# Patient Record
Sex: MALE | Race: BLACK OR AFRICAN AMERICAN | Employment: FULL TIME | URBAN - METROPOLITAN AREA
[De-identification: names, ages, dates, MRNs, and addresses within clinical notes are randomized per-mention and may not be internally consistent; named-entity substitution may affect disease eponyms.]

---

## 2017-05-23 ENCOUNTER — HOSPITAL ENCOUNTER (EMERGENCY)
Facility: CLINIC | Age: 37
Discharge: HOME | End: 2017-05-24
Attending: EMERGENCY MEDICINE | Admitting: EMERGENCY MEDICINE
Payer: COMMERCIAL

## 2017-05-23 ENCOUNTER — APPOINTMENT (OUTPATIENT)
Dept: GENERAL RADIOLOGY | Facility: CLINIC | Age: 37
End: 2017-05-23
Attending: EMERGENCY MEDICINE
Payer: COMMERCIAL

## 2017-05-23 DIAGNOSIS — R07.9 CHEST PAIN, UNSPECIFIED TYPE: ICD-10-CM

## 2017-05-23 LAB
ANION GAP SERPL CALCULATED.3IONS-SCNC: 9 MMOL/L (ref 3–14)
BASOPHILS # BLD AUTO: 0 10E9/L (ref 0–0.2)
BASOPHILS NFR BLD AUTO: 0.9 %
BUN SERPL-MCNC: 12 MG/DL (ref 7–30)
CALCIUM SERPL-MCNC: 8.8 MG/DL (ref 8.5–10.1)
CHLORIDE SERPL-SCNC: 107 MMOL/L (ref 94–109)
CO2 SERPL-SCNC: 27 MMOL/L (ref 20–32)
CREAT SERPL-MCNC: 0.92 MG/DL (ref 0.66–1.25)
DIFFERENTIAL METHOD BLD: ABNORMAL
EOSINOPHIL # BLD AUTO: 0.5 10E9/L (ref 0–0.7)
EOSINOPHIL NFR BLD AUTO: 10.6 %
ERYTHROCYTE [DISTWIDTH] IN BLOOD BY AUTOMATED COUNT: 13.4 % (ref 10–15)
GFR SERPL CREATININE-BSD FRML MDRD: ABNORMAL ML/MIN/1.7M2
GLUCOSE SERPL-MCNC: 119 MG/DL (ref 70–99)
HCT VFR BLD AUTO: 40.4 % (ref 40–53)
HGB BLD-MCNC: 13.9 G/DL (ref 13.3–17.7)
IMM GRANULOCYTES # BLD: 0 10E9/L (ref 0–0.4)
IMM GRANULOCYTES NFR BLD: 0.4 %
LYMPHOCYTES # BLD AUTO: 1.6 10E9/L (ref 0.8–5.3)
LYMPHOCYTES NFR BLD AUTO: 34.9 %
MCH RBC QN AUTO: 28.7 PG (ref 26.5–33)
MCHC RBC AUTO-ENTMCNC: 34.4 G/DL (ref 31.5–36.5)
MCV RBC AUTO: 83 FL (ref 78–100)
MONOCYTES # BLD AUTO: 0.4 10E9/L (ref 0–1.3)
MONOCYTES NFR BLD AUTO: 8.5 %
NEUTROPHILS # BLD AUTO: 2.1 10E9/L (ref 1.6–8.3)
NEUTROPHILS NFR BLD AUTO: 44.7 %
NRBC # BLD AUTO: 0 10*3/UL
NRBC BLD AUTO-RTO: 0 /100
PLATELET # BLD AUTO: 105 10E9/L (ref 150–450)
POTASSIUM SERPL-SCNC: 3.5 MMOL/L (ref 3.4–5.3)
RBC # BLD AUTO: 4.85 10E12/L (ref 4.4–5.9)
SODIUM SERPL-SCNC: 143 MMOL/L (ref 133–144)
TROPONIN I SERPL-MCNC: NORMAL UG/L (ref 0–0.04)
WBC # BLD AUTO: 4.6 10E9/L (ref 4–11)

## 2017-05-23 PROCEDURE — 71020 XR CHEST 2 VW: CPT

## 2017-05-23 PROCEDURE — 85025 COMPLETE CBC W/AUTO DIFF WBC: CPT | Performed by: EMERGENCY MEDICINE

## 2017-05-23 PROCEDURE — 99285 EMERGENCY DEPT VISIT HI MDM: CPT | Mod: 25

## 2017-05-23 PROCEDURE — 25000132 ZZH RX MED GY IP 250 OP 250 PS 637: Performed by: EMERGENCY MEDICINE

## 2017-05-23 PROCEDURE — 80048 BASIC METABOLIC PNL TOTAL CA: CPT | Performed by: EMERGENCY MEDICINE

## 2017-05-23 PROCEDURE — 93005 ELECTROCARDIOGRAM TRACING: CPT

## 2017-05-23 PROCEDURE — 96374 THER/PROPH/DIAG INJ IV PUSH: CPT

## 2017-05-23 PROCEDURE — 84484 ASSAY OF TROPONIN QUANT: CPT | Performed by: EMERGENCY MEDICINE

## 2017-05-23 RX ORDER — NITROGLYCERIN 0.4 MG/1
0.4 TABLET SUBLINGUAL EVERY 5 MIN PRN
Status: DISCONTINUED | OUTPATIENT
Start: 2017-05-23 | End: 2017-05-24 | Stop reason: HOSPADM

## 2017-05-23 RX ORDER — ASPIRIN 325 MG
325 TABLET ORAL ONCE
Status: COMPLETED | OUTPATIENT
Start: 2017-05-23 | End: 2017-05-23

## 2017-05-23 RX ADMIN — NITROGLYCERIN 0.4 MG: 0.4 TABLET SUBLINGUAL at 23:29

## 2017-05-23 RX ADMIN — ASPIRIN 325 MG ORAL TABLET 325 MG: 325 PILL ORAL at 23:27

## 2017-05-23 ASSESSMENT — ENCOUNTER SYMPTOMS
SHORTNESS OF BREATH: 0
FEVER: 0
ABDOMINAL PAIN: 0
VOMITING: 0

## 2017-05-23 NOTE — ED AVS SNAPSHOT
Emergency Department    [address] [facility] [address]    Phone:  [phone]    Fax:  [phone]                                       Rachel Blackman   MRN: [id number]    Department:   Emergency Department   Date of Visit:  5/23/2017           Patient Information     Date Of Birth          1980        Your diagnoses for this visit were:     Chest pain, unspecified type        You were seen by Archie Vargas DO.      Follow-up Information     Follow up with Ajay Nelson MD In 2 days.    Contact information:    [facility]  [address]  [place] [address]  [phone]          Follow up with  Emergency Department.    Specialty:  EMERGENCY MEDICINE    Why:  If symptoms worsen    Contact information:    [address] [facility] [address] [phone]        Discharge Instructions          *CHEST PAIN, UNCERTAIN CAUSE    Based on your exam today, the exact cause of your chest pain is not certain. Your condition does not seem serious at this time, and your pain does not appear to be coming from your heart. However, sometimes the signs of a serious problem take more time to appear. Therefore, watch for the warning signs listed below.  HOME CARE:  1. Rest today and avoid strenuous activity.  2. Take any prescribed medicine as directed.  FOLLOW UP with your doctor in 1-3 days.   GET PROMPT MEDICAL ATTENTION if any of the following occur:    A change in the type of pain: if it feels different, becomes more severe, lasts longer, or begins to spread into your shoulder, arm, neck, jaw or back    Shortness of breath or increased pain with breathing    Weakness, dizziness, or fainting    Cough with blood or dark colored sputum (phlegm)    Fever over 101  F (38.3  C)    Swelling, pain or redness in one leg    1255-0440 [facility], [address] [place] [address], [place] [address]. All rights reserved. This information is not intended as a substitute for  professional medical care. Always follow your healthcare professional's instructions.      24 Hour Appointment Hotline       To make an appointment at any [facility], call [phone] ([phone]). If you don't have a family doctor or clinic, we will help you find one. [facility] clinics are conveniently located to serve the needs of you and your family.          ED Discharge Orders     Exercise Stress Echocardiogram       The supervising Cardiologist may change the type of echocardiogram requested to answer a specific clinical question based on existing protocols in the Echocardiography laboratory.    Use of contrast is at the discretion of the supervising Cardiologist.                     Review of your medicines      Our records show that you are taking the medicines listed below. If these are incorrect, please call your family doctor or clinic.        Dose / Directions Last dose taken    DILTIAZEM HCL PO        Refills:  0        isoniazid 300 MG tablet   Commonly known as:  NYDRAZID   Dose:  300 mg   Quantity:  90 tablet        Take 1 tablet (300 mg) by mouth daily   Refills:  2        LOSARTAN POTASSIUM PO        Refills:  0        pyridOXINE 25 MG tablet   Commonly known as:  vitamin B-6   Dose:  25 mg   Quantity:  90 tablet        Take 1 tablet (25 mg) by mouth daily   Refills:  2                Procedures and tests performed during your visit     Procedure/Test Number of Times Performed    Basic metabolic panel 1    CBC with platelets differential 1    EKG 12 lead 1    Troponin I 2    XR Chest 2 Views 1      Orders Needing Specimen Collection     None      Pending Results     Date and Time Order Name Status Description    5/23/2017 2320 XR Chest 2 Views Preliminary     5/23/2017 2305 EKG 12 lead Preliminary             Pending Culture Results     No orders found for last 3 day(s).            Pending Results Instructions     If you had any lab results that were not finalized at the time of your  Discharge, you can call the ED Lab Result RN at [phone]. You will be contacted by this team for any positive Lab results or changes in treatment. The nurses are available 7 days a week from 10A to 6:30P.  You can leave a message 24 hours per day and they will return your call.        Test Results From Your Hospital Stay        5/23/2017 11:33 PM      Component Results     Component Value Ref Range & Units Status    WBC 4.6 4.0 - 11.0 10e9/L Final    RBC Count 4.85 4.4 - 5.9 10e12/L Final    Hemoglobin 13.9 13.3 - 17.7 g/dL Final    Hematocrit 40.4 40.0 - 53.0 % Final    MCV 83 78 - 100 fl Final    MCH 28.7 26.5 - 33.0 pg Final    MCHC 34.4 31.5 - 36.5 g/dL Final    RDW 13.4 10.0 - 15.0 % Final    Platelet Count 105 (L) 150 - 450 10e9/L Final    Diff Method Automated Method  Final    % Neutrophils 44.7 % Final    % Lymphocytes 34.9 % Final    % Monocytes 8.5 % Final    % Eosinophils 10.6 % Final    % Basophils 0.9 % Final    % Immature Granulocytes 0.4 % Final    Nucleated RBCs 0 0 /100 Final    Absolute Neutrophil 2.1 1.6 - 8.3 10e9/L Final    Absolute Lymphocytes 1.6 0.8 - 5.3 10e9/L Final    Absolute Monocytes 0.4 0.0 - 1.3 10e9/L Final    Absolute Eosinophils 0.5 0.0 - 0.7 10e9/L Final    Absolute Basophils 0.0 0.0 - 0.2 10e9/L Final    Abs Immature Granulocytes 0.0 0 - 0.4 10e9/L Final    Absolute Nucleated RBC 0.0  Final         5/23/2017 11:49 PM      Component Results     Component Value Ref Range & Units Status    Sodium 143 133 - 144 mmol/L Final    Potassium 3.5 3.4 - 5.3 mmol/L Final    Chloride 107 94 - 109 mmol/L Final    Carbon Dioxide 27 20 - 32 mmol/L Final    Anion Gap 9 3 - 14 mmol/L Final    Glucose 119 (H) 70 - 99 mg/dL Final    Urea Nitrogen 12 7 - 30 mg/dL Final    Creatinine 0.92 0.66 - 1.25 mg/dL Final    GFR Estimate >90  Non  GFR Calc   >60 mL/min/1.7m2 Final    GFR Estimate If Black >90   GFR Calc   >60 mL/min/1.7m2 Final    Calcium 8.8 8.5 - 10.1 mg/dL  Final         5/23/2017 11:51 PM      Component Results     Component Value Ref Range & Units Status    Troponin I ES  0.000 - 0.045 ug/L Final    <0.015  The 99th percentile for upper reference range is 0.045 ug/L.  Troponin values in   the range of 0.045 - 0.120 ug/L may be associated with risks of adverse   clinical events.           5/24/2017 12:20 AM      Narrative     CHEST 2 VIEWS  5/23/2017 11:36 PM     HISTORY: Right-sided chest pain.    COMPARISON: 4/6/2015.    FINDINGS: The lungs are clear. Normal-sized cardiac silhouette.        Impression     IMPRESSION: No evidence of active cardiopulmonary disease.         5/24/2017  1:31 AM      Component Results     Component Value Ref Range & Units Status    Troponin I ES  0.000 - 0.045 ug/L Final    <0.015  The 99th percentile for upper reference range is 0.045 ug/L.  Troponin values in   the range of 0.045 - 0.120 ug/L may be associated with risks of adverse   clinical events.                  Clinical Quality Measure: Blood Pressure Screening     Your blood pressure was checked while you were in the emergency department today. The last reading we obtained was  BP: (!) 153/98 . Please read the guidelines below about what these numbers mean and what you should do about them.  If your systolic blood pressure (the top number) is less than 120 and your diastolic blood pressure (the bottom number) is less than 80, then your blood pressure is normal. There is nothing more that you need to do about it.  If your systolic blood pressure (the top number) is 120-139 or your diastolic blood pressure (the bottom number) is 80-89, your blood pressure may be higher than it should be. You should have your blood pressure rechecked within a year by a primary care provider.  If your systolic blood pressure (the top number) is 140 or greater or your diastolic blood pressure (the bottom number) is 90 or greater, you may have high blood pressure. High blood pressure is treatable, but  "if left untreated over time it can put you at risk for heart attack, stroke, or kidney failure. You should have your blood pressure rechecked by a primary care provider within the next 4 weeks.  If your provider in the emergency department today gave you specific instructions to follow-up with your doctor or provider even sooner than that, you should follow that instruction and not wait for up to 4 weeks for your follow-up visit.        Thank you for choosing [facility]       Thank you for choosing [facility] for your care. Our goal is always to provide you with excellent care. Hearing back from our patients is one way we can continue to improve our services. Please take a few minutes to complete the written survey that you may receive in the mail after you visit with us. Thank you!        [facility] Information     [facility] lets you send messages to your doctor, view your test results, renew your prescriptions, schedule appointments and more. To sign up, go to [email or web address] . Click on \"Log in\" on the left side of the screen, which will take you to the Welcome page. Then click on \"Sign up Now\" on the right side of the page.     You will be asked to enter the access code listed below, as well as some personal information. Please follow the directions to create your username and password.     Your access code is: [email or web address]  Expires: 2017  1:36 AM     Your access code will  in 90 days. If you need help or a new code, please call your [facility] clinic or [phone].        Care EveryWhere ID     This is your Care EveryWhere ID. This could be used by other organizations to access your [facility] medical records  [id number]        After Visit Summary       This is your record. Keep this with you and show to your community pharmacist(s) and doctor(s) at your next visit.                  "

## 2017-05-23 NOTE — ED AVS SNAPSHOT
Emergency Department    [address] [place] [address]    Phone:  [phone]    Fax:  [phone]                                       Rachel Blackman   MRN: [id number]    Department:   Emergency Department   Date of Visit:  5/23/2017           After Visit Summary Signature Page     I have received my discharge instructions, and my questions have been answered. I have discussed any challenges I see with this plan with the nurse or doctor.    ..........................................................................................................................................  Patient/Patient Representative Signature      ..........................................................................................................................................  Patient Representative Print Name and Relationship to Patient    ..................................................               ................................................  Date                                            Time    ..........................................................................................................................................  Reviewed by Signature/Title    ...................................................              ..............................................  Date                                                            Time

## 2017-05-24 VITALS
HEART RATE: 77 BPM | DIASTOLIC BLOOD PRESSURE: 103 MMHG | BODY MASS INDEX: 24.78 KG/M2 | RESPIRATION RATE: 14 BRPM | OXYGEN SATURATION: 97 % | TEMPERATURE: 98.5 F | WEIGHT: 177 LBS | HEIGHT: 71 IN | SYSTOLIC BLOOD PRESSURE: 155 MMHG

## 2017-05-24 LAB
INTERPRETATION ECG - MUSE: NORMAL
TROPONIN I SERPL-MCNC: NORMAL UG/L (ref 0–0.04)

## 2017-05-24 PROCEDURE — 84484 ASSAY OF TROPONIN QUANT: CPT | Performed by: EMERGENCY MEDICINE

## 2017-05-24 PROCEDURE — 25000128 H RX IP 250 OP 636: Performed by: EMERGENCY MEDICINE

## 2017-05-24 RX ORDER — KETOROLAC TROMETHAMINE 30 MG/ML
30 INJECTION, SOLUTION INTRAMUSCULAR; INTRAVENOUS ONCE
Status: COMPLETED | OUTPATIENT
Start: 2017-05-24 | End: 2017-05-24

## 2017-05-24 RX ADMIN — KETOROLAC TROMETHAMINE 30 MG: 30 INJECTION, SOLUTION INTRAMUSCULAR at 00:20

## 2017-05-24 NOTE — ED PROVIDER NOTES
"  History     Chief Complaint:  Chest Pain    HPI   Rachel Blackman is a 36 year old male, with a history of hypertension, who presents in the emergency department for evaluation of chest pain. The patient noted the pain started around 2130 as the patient was just walking around, then experienced a sudden onset of right chest pain and heaviness in the right shoulder. He noted the only movement to exacerbate the pain was turning his head to the right. He denies experiencing any vomiting, fever, shortness of breath, diaphoresis, or abdominal pain.    Allergies:  No Known Drug Allergies      Medications:    Diltiazem HCl  Losartan potassium  Nydrazid    Past Medical History:    Hypertension    Past Surgical History:    Hemorrhoidectomy    Family History:    Hypertension  Diabetes    Social History:  The patient was accompanied to the ED alone.  Smoking Status: No  Smokeless Tobacco: No  Alcohol Use: No   Marital Status:  Single [1]     Review of Systems   Constitutional: Negative for fever.   Respiratory: Negative for shortness of breath.    Cardiovascular: Positive for chest pain.   Gastrointestinal: Negative for abdominal pain and vomiting.     Physical Exam   Vitals:  Patient Vitals for the past 24 hrs:   BP Temp Temp src Pulse Heart Rate Resp SpO2 Height Weight   05/24/17 0100 (!) 153/98 - - - 72 13 98 % - -   05/24/17 0045 (!) 157/101 - - - 68 15 96 % - -   05/24/17 0030 (!) 157/99 - - - 73 16 95 % - -   05/24/17 0015 (!) 152/102 - - - 75 15 96 % - -   05/24/17 0000 (!) 158/100 - - - 69 13 97 % - -   05/23/17 2345 (!) 157/97 - - - 77 14 98 % - -   05/23/17 2343 - - - - 70 16 95 % - -   05/23/17 2342 - - - - 71 16 95 % - -   05/23/17 2341 (!) 152/98 - - - 77 11 - - -   05/23/17 2330 (!) 157/101 - - - 77 11 96 % - -   05/23/17 2309 (!) 149/106 98.5  F (36.9  C) Oral 77 - 18 99 % 1.803 m (5' 11\") 80.3 kg (177 lb)      Physical Exam   Physical Exam   General:  Sitting on bed with alone.   HENT:  No obvious trauma to " head  Right Ear:  External ear normal.   Left Ear:  External ear normal.   Nose:  Nose normal.   Eyes:  Conjunctivae and EOM are normal. Pupils are equal, round, and reactive.   Neck: Normal range of motion. Neck supple. No tracheal deviation present.   CV:  Normal heart sounds. No murmur heard.  Pulm/Chest: Effort normal and breath sounds normal.   Abd: Soft. No distension. There is no tenderness. There is no rigidity, no rebound and no guarding.   M/S: Normal range of motion. right sided chest pain is reproducible when the pt turns his head and engages the muscles of the right chest wall.  Neuro: Alert. GCS 15.  Skin: Skin is warm and dry. No rash noted. Not diaphoretic.   Psych: Normal mood and affect. Behavior is normal.     Emergency Department Course     ECG:  ECG taken at 2313, ECG read at 2315  Normal sinus rhythm  Septal infarct, age undetermined  Abnormal ECG  Rate 69 bpm. TN interval 154. QRS duration 86. QT/QTc 378/405. P-R-T axes 61 29 31.     Imaging:  Radiology findings were communicated with the patient who voiced understanding of the findings.  XR Chest:  IMPRESSION: No evidence of active cardiopulmonary disease.  Reading per radiology.     Laboratory:  Laboratory findings were communicated with the patient who voiced understanding of the findings.  CBC:  o/w WNL (WBC 4.6, HGB 13.9)   BMP: Glucose 119 (H) o/w WNL (Creatinine 0.92)   Troponin (Collected 2325): <0.015   Troponin (Collected 0025): <0.015      Interventions:  2327 Aspirin 325 mg PO  2329 Nitro 0.4 mg SL  0020 Toradol 30 mg IV      Emergency Department Course:  Nursing notes and vitals reviewed.  I performed an exam of the patient as documented above.   The patient was sent for a XR chest while in the emergency department, results above.   IV was inserted and blood was drawn for laboratory testing, results above.   EKG obtained in the ED, see results above.      0115 Re-examined pt. He feels much better now after the Toradol and  is near pain free.    I discussed the treatment plan with the patient. They expressed understanding of this plan and consented to discharge. They will be discharged home with instructions for care and follow up. In addition, the patient will return to the emergency department if their symptoms persist, worsen, if new symptoms arise or if there is any concern.  All questions were answered.     I personally reviewed the laboratory results with the Patient and answered all related questions prior to discharge.    Impression & Plan      Medical Decision Making:  Rachel Blackman is a very pleasant 36 year old year old male who presents to the emergency department with concern of chest pain of unclear etiology.  At this time I do not suspect that there is an acute/dangerous pathology for the chest pain.  They have no significant personal/family history of cardiac disease. They have no significant cardiac risk factors.  The EKG was reviewed and shows no evidence of Brugada syndrome, Major-Parkinson-White, hypertrophic cardiomyopathy or prolonged QTc syndrome. The chest xray was reviewed and shows no evidence of pneumothorax, infiltrate to suggest pneumonia, widened mediastinum to suggest aortic dissection, obvious rib fracture or free air under the diaphragm to suggest perforated viscous ulcer. Their troponin was negative and a repeat 2 hour troponin was also negative. The patient does not smoke and is otherwise PERC negative. There are no focal infiltrates, effusions, pulmonary edema, or evidence of PTX on CXR. The patient has not had recent fevers or viral infections to suggest pericarditis.  They are at low risk for aortic pathology and have normal aortic contour on CXR.  They have not had recent chest trauma.  All of this was discussed with the patient and they were reassurred. I have ordered an outpatient stress test for the patient to schedule sometime early next week. I have advised them to follow-up with their PCP  in the next 3 days to get further evaluation, and to return to the ED sooner if their chest pain continues/worsens, they develop severe SOB/fevers/lightheadedness, or they develop any other new and concerning symptoms. At this point the patient is stable and appropriate for discharge. His pain is only present on the right side and is reproducible with movement of those muscles and I suspect that this is musculoskeletal.    The treatment plan was discussed with the patient and they expressed understanding of this plan and consented to the plan.  In addition, the patient will return to the emergency department if their symptoms persist, worsen, if new symptoms arise or if there is any concern as other pathology may be present that is not evident at this time. They also understand the importance of close follow up in the clinic and if unable to do so will return to the emergency department for a reevaluation. All questions were answered.    Diagnosis:    ICD-10-CM    1. Chest pain, unspecified type R07.9 Exercise Stress Echocardiogram     Troponin I     Disposition:   Discharged    Discharge Medications:  New Prescriptions    No medications on file     Scribe Disclosure:  Chastity MOULTON, am serving as a scribe at 11:17 PM on 5/23/2017 to document services personally performed by Archie Vargas DO, based on my observations and the provider's statements to me.   5/23/2017    EMERGENCY DEPARTMENT       Archie Vargas DO  05/24/17 0137

## 2017-05-24 NOTE — DISCHARGE INSTRUCTIONS
*CHEST PAIN, UNCERTAIN CAUSE    Based on your exam today, the exact cause of your chest pain is not certain. Your condition does not seem serious at this time, and your pain does not appear to be coming from your heart. However, sometimes the signs of a serious problem take more time to appear. Therefore, watch for the warning signs listed below.  HOME CARE:  1. Rest today and avoid strenuous activity.  2. Take any prescribed medicine as directed.  FOLLOW UP with your doctor in 1-3 days.   GET PROMPT MEDICAL ATTENTION if any of the following occur:    A change in the type of pain: if it feels different, becomes more severe, lasts longer, or begins to spread into your shoulder, arm, neck, jaw or back    Shortness of breath or increased pain with breathing    Weakness, dizziness, or fainting    Cough with blood or dark colored sputum (phlegm)    Fever over 101  F (38.3  C)    Swelling, pain or redness in one leg    [id number] [address], [place] [address]. All rights reserved. This information is not intended as a substitute for professional medical care. Always follow your healthcare professional's instructions.

## 2017-06-07 ENCOUNTER — HOSPITAL ENCOUNTER (OUTPATIENT)
Dept: CARDIOLOGY | Facility: CLINIC | Age: 37
Discharge: HOME | End: 2017-06-07
Attending: EMERGENCY MEDICINE | Admitting: EMERGENCY MEDICINE
Payer: MEDICAID

## 2017-06-07 DIAGNOSIS — R07.9 CHEST PAIN, UNSPECIFIED TYPE: ICD-10-CM

## 2017-06-07 PROCEDURE — 25500064 ZZH RX 255 OP 636: Performed by: EMERGENCY MEDICINE

## 2017-06-07 PROCEDURE — 93325 DOPPLER ECHO COLOR FLOW MAPG: CPT | Mod: 26 | Performed by: INTERNAL MEDICINE

## 2017-06-07 PROCEDURE — 40000264 ECHO STRESS TEST WITH LUMASON

## 2017-06-07 PROCEDURE — 93016 CV STRESS TEST SUPVJ ONLY: CPT | Performed by: INTERNAL MEDICINE

## 2017-06-07 PROCEDURE — 93018 CV STRESS TEST I&R ONLY: CPT | Performed by: INTERNAL MEDICINE

## 2017-06-07 PROCEDURE — 93350 STRESS TTE ONLY: CPT | Mod: 26 | Performed by: INTERNAL MEDICINE

## 2017-06-07 PROCEDURE — 93321 DOPPLER ECHO F-UP/LMTD STD: CPT | Mod: 26 | Performed by: INTERNAL MEDICINE

## 2017-06-07 RX ADMIN — SULFUR HEXAFLUORIDE 5 ML: KIT at 16:10

## 2017-06-23 ENCOUNTER — HOSPITAL ENCOUNTER (EMERGENCY)
Facility: CLINIC | Age: 37
Discharge: HOME | End: 2017-06-23
Attending: EMERGENCY MEDICINE | Admitting: EMERGENCY MEDICINE
Payer: OTHER MISCELLANEOUS

## 2017-06-23 VITALS
BODY MASS INDEX: 24.5 KG/M2 | OXYGEN SATURATION: 99 % | DIASTOLIC BLOOD PRESSURE: 86 MMHG | SYSTOLIC BLOOD PRESSURE: 128 MMHG | HEIGHT: 71 IN | TEMPERATURE: 97.7 F | HEART RATE: 61 BPM | RESPIRATION RATE: 16 BRPM | WEIGHT: 175 LBS

## 2017-06-23 DIAGNOSIS — S09.90XA CLOSED HEAD INJURY, INITIAL ENCOUNTER: ICD-10-CM

## 2017-06-23 PROCEDURE — 99282 EMERGENCY DEPT VISIT SF MDM: CPT

## 2017-06-23 ASSESSMENT — ENCOUNTER SYMPTOMS
VOMITING: 0
FEVER: 0
WOUND: 0
HEADACHES: 1
DIZZINESS: 1
NAUSEA: 0

## 2017-06-23 NOTE — DISCHARGE INSTRUCTIONS
Take ibuprofen or Tylenol as needed for pain.    Drink plenty of fluids and get plenty of rest.    Avoid all contact sports or activities that could put you at risk for another head injury for at least 1 week from when you are feeling back to normal.

## 2017-06-23 NOTE — LETTER
EMERGENCY DEPARTMENT  [address] [facility] [address]  [phone]    2017    Rachel Blackman  [address]   [facility] [address]  none (home) none (work)    : 1980      To Whom it may concern:    Rachel Blackman was seen in our Emergency Department today, 2017. Please excuse him from work today and tomorrow.  He can return to normal duty on 17.     Sincerely,        Sally Casillas

## 2017-06-23 NOTE — ED AVS SNAPSHOT
Emergency Department    [address] [facility] [address]    Phone:  [phone]    Fax:  [phone]                                       Rachel Blackman   MRN: [id number]    Department:   Emergency Department   Date of Visit:  6/23/2017           Patient Information     Date Of Birth          1980        Your diagnoses for this visit were:     Closed head injury, initial encounter        You were seen by Sally Casillas MD.      Follow-up Information     Follow up with  Emergency Department.    Specialty:  EMERGENCY MEDICINE    Why:  As needed, If symptoms worsen or for vomiting or confusion    Contact information:    [address] [facility] [address] [phone]        Follow up with Ajay Nelson MD.    Why:  next week for recheck    Contact information:    [facility]  [address]  [place] [address]  [phone]          Discharge Instructions       Take ibuprofen or Tylenol as needed for pain.    Drink plenty of fluids and get plenty of rest.    Avoid all contact sports or activities that could put you at risk for another head injury for at least 1 week from when you are feeling back to normal.    Discharge References/Attachments     HEAD INJURY WITH SLEEP MONITORING (ADULT) (ENGLISH)      24 Hour Appointment Hotline       To make an appointment at any [facility], call [phone] ([phone]). If you don't have a family doctor or clinic, we will help you find one. [facility] are conveniently located to serve the needs of you and your family.             Review of your medicines      Our records show that you are taking the medicines listed below. If these are incorrect, please call your family doctor or clinic.        Dose / Directions Last dose taken    DILTIAZEM HCL PO        Refills:  0        isoniazid 300 MG tablet   Commonly known as:  NYDRAZID   Dose:  300 mg   Quantity:  90 tablet        Take 1 tablet (300 mg) by  mouth daily   Refills:  2        LOSARTAN POTASSIUM PO        Refills:  0        pyridOXINE 25 MG tablet   Commonly known as:  vitamin B-6   Dose:  25 mg   Quantity:  90 tablet        Take 1 tablet (25 mg) by mouth daily   Refills:  2                Orders Needing Specimen Collection     None      Pending Results     No orders found from 6/21/2017 to 6/24/2017.            Pending Culture Results     No orders found from 6/21/2017 to 6/24/2017.            Pending Results Instructions     If you had any lab results that were not finalized at the time of your Discharge, you can call the ED Lab Result RN at [phone]. You will be contacted by this team for any positive Lab results or changes in treatment. The nurses are available 7 days a week from 10A to 6:30P.  You can leave a message 24 hours per day and they will return your call.        Test Results From Your Hospital Stay               Clinical Quality Measure: Blood Pressure Screening     Your blood pressure was checked while you were in the emergency department today. The last reading we obtained was  BP: 128/86 . Please read the guidelines below about what these numbers mean and what you should do about them.  If your systolic blood pressure (the top number) is less than 120 and your diastolic blood pressure (the bottom number) is less than 80, then your blood pressure is normal. There is nothing more that you need to do about it.  If your systolic blood pressure (the top number) is 120-139 or your diastolic blood pressure (the bottom number) is 80-89, your blood pressure may be higher than it should be. You should have your blood pressure rechecked within a year by a primary care provider.  If your systolic blood pressure (the top number) is 140 or greater or your diastolic blood pressure (the bottom number) is 90 or greater, you may have high blood pressure. High blood pressure is treatable, but if left untreated over time it can put you at risk for heart  "attack, stroke, or kidney failure. You should have your blood pressure rechecked by a primary care provider within the next 4 weeks.  If your provider in the emergency department today gave you specific instructions to follow-up with your doctor or provider even sooner than that, you should follow that instruction and not wait for up to 4 weeks for your follow-up visit.        Thank you for choosing [facility]       Thank you for choosing [facility] for your care. Our goal is always to provide you with excellent care. Hearing back from our patients is one way we can continue to improve our services. Please take a few minutes to complete the written survey that you may receive in the mail after you visit with us. Thank you!        [employer] Information     [facility] lets you send messages to your doctor, view your test results, renew your prescriptions, schedule appointments and more. To sign up, go to [email or web address] . Click on \"Log in\" on the left side of the screen, which will take you to the Welcome page. Then click on \"Sign up Now\" on the right side of the page.     You will be asked to enter the access code listed below, as well as some personal information. Please follow the directions to create your username and password.     Your access code is: [email or web address]  Expires: 2017  1:36 AM     Your access code will  in 90 days. If you need help or a new code, please call your [facility] clinic or [phone].        Care EveryWhere ID     This is your Care EveryWhere ID. This could be used by other organizations to access your [facility] medical records  [id number]        Equal Access to Services     LEIGHA FERNÁNDEZ : Haddebbi warren Somichelle, waaxda luqadaha, qaybta kaalmada joshua, pino herbert. So [facility] [phone].    ATENCIÓN: Si habla español, tiene a sierra disposición servicios gratuitos de asistencia lingüística. Llame al [phone].    We comply with applicable " federal civil rights laws and [place] laws. We do not discriminate on the basis of race, color, national origin, age, disability sex, sexual orientation or gender identity.            After Visit Summary       This is your record. Keep this with you and show to your community pharmacist(s) and doctor(s) at your next visit.

## 2017-06-23 NOTE — ED AVS SNAPSHOT
Emergency Department    [address] [place] [address]    Phone:  [phone]    Fax:  [phone]                                       Rachel Blackman   MRN: [id number]    Department:   Emergency Department   Date of Visit:  6/23/2017           After Visit Summary Signature Page     I have received my discharge instructions, and my questions have been answered. I have discussed any challenges I see with this plan with the nurse or doctor.    ..........................................................................................................................................  Patient/Patient Representative Signature      ..........................................................................................................................................  Patient Representative Print Name and Relationship to Patient    ..................................................               ................................................  Date                                            Time    ..........................................................................................................................................  Reviewed by Signature/Title    ...................................................              ..............................................  Date                                                            Time

## 2017-06-23 NOTE — ED PROVIDER NOTES
"  History     Chief Complaint:  Headache    HPI   Rachel Blackman is a 36 year old male who presents to the emergency department today for evaluation of a headache. The patient reports that he bent over and upon coming up he hit the back of his head on a counter four hours ago at 1300. Since the incident, the headache has not subsided so he presents to the ED for evaluation. The pain is in his head and radiates into his neck. The patient denies vomiting and unconsciousness. Of note, the patient is feeling dizzy but has been fasting for Ramadan and does not want to take any medication.     Allergies:  No Known Drug Allergies    Medications:    DILTIAZEM HCL PO  LOSARTAN POTASSIUM PO  isoniazid (NYDRAZID) 300 MG tablet    Past Medical History:    Latent Tuberculosis   Hypertension    Past Surgical History:    Hemorrhoidectomy    Family History:    Hypertension  Diabetes    Social History:  The patient was alone.  Smoking Status: Never Smoker  Smokeless Tobacco: No  Alcohol Use: No  Marital Status:       Review of Systems   Constitutional: Negative for fever.   Gastrointestinal: Negative for nausea and vomiting.   Skin: Negative for wound.   Neurological: Positive for dizziness and headaches. Negative for syncope.   All other systems reviewed and are negative.    Physical Exam   First Vitals:  BP: 128/86  Pulse: 61  Temp: 97.7  F (36.5  C)  Resp: 16  Height: 180.3 cm (5' 11\")  Weight: 79.4 kg (175 lb)  SpO2: 99 %      Physical Exam  Nursing note and vitals reviewed.  Constitutional:  Appears well-developed and well-nourished.   HENT:   Head:    No scalp swelling. Upper scalp tenderness at the junction of the occipital and parietal midline.   Mouth/Throat:   Oropharynx is clear and moist. No oropharyngeal exudate.   Eyes:    Pupils are equal, round, and reactive to light.   Neck:    Normal range of motion. Neck supple.      No tracheal deviation present. No thyromegaly present.   Cardiovascular:  Normal rate, " regular rhythm, no murmur   Pulmonary/Chest: Breath sounds are clear and equal without wheezes or crackles.  Abdominal:   Soft. Bowel sounds are normal. Exhibits no distension and      no mass. There is no tenderness.      There is no rebound and no guarding.   Musculoskeletal:  Exhibits no edema. No midline cervical spinal tenderness  Lymphadenopathy:  No cervical adenopathy.   Neurological:   Alert and oriented to person, place, and time. GCS 15.  CN 2-12 intact.     and proximal upper extremity strength strong and equal.  Bilateral lower    extremity strength strong and equal, including strong dorsiflexion and    plantarflexion strength.  Sensation intact and equal to the face, arms and    legs.  No facial droop or weakness. Normal speech.  Follows commands    and answers questions normally.    Skin:    Skin is warm and dry. No rash noted. No pallor.     Emergency Department Course     Emergency Department Course:  Nursing notes and vitals reviewed.  I performed an exam of the patient as documented above.   1735: Patient denies any pain medications because he is fasting for Ramadan.  I discussed the treatment plan with the patient. They expressed understanding of this plan and consented to discharge. They will be discharged home with instructions for care and follow up. In addition, the patient will return to the emergency department if their symptoms persist, worsen, if new symptoms arise or if there is any concern.  All questions were answered.    Impression & Plan      Medical Decision Making:  Rachel Blackman is a 36 year old male who presents for evaluation of closed head injury. History and exam are most consistent with concussion. The differential diagnosis also includes skull fracture and various types of intracranial hemorrhage (e.g., epidural hematoma, subdural hematoma). The patient s did not present with  red flag  characteristics based on established clinical guidelines to suggest more serious  intracranial injury or indicate CT imaging. The patient does not take Coumadin.  I have discussed the risk/benefit analysis with the patient regarding CT imaging.  We have decided to hold off on imaging at this time.  He understands return is required for worsening headache, vomiting, confusion, and other concerning symptoms. I provided information regarding on head injury in writing upon discharge. We discussed the importance of not sustaining a concussion while still symptomatic. I recommended primary care follow up in 2-3 days for recheck, and return precautions as above.    Diagnosis:    ICD-10-CM    1. Closed head injury, initial encounter S09.90XA      Disposition:   Discharged to home     Scribe Disclosure:  I, Tiffani Mark, am serving as a scribe at 5:37 PM on 6/23/2017 to document services personally performed by Sally Casillas MD, based on my observations and the provider's statements to me.  6/23/2017    EMERGENCY DEPARTMENT       Sally Casillas MD  06/24/17 0208

## 2017-08-17 ENCOUNTER — HOSPITAL ENCOUNTER (EMERGENCY)
Facility: CLINIC | Age: 37
Discharge: HOME | End: 2017-08-17
Attending: PHYSICIAN ASSISTANT | Admitting: PHYSICIAN ASSISTANT
Payer: OTHER MISCELLANEOUS

## 2017-08-17 VITALS
OXYGEN SATURATION: 96 % | DIASTOLIC BLOOD PRESSURE: 100 MMHG | BODY MASS INDEX: 23.7 KG/M2 | WEIGHT: 175 LBS | SYSTOLIC BLOOD PRESSURE: 151 MMHG | HEIGHT: 72 IN | TEMPERATURE: 98.8 F

## 2017-08-17 DIAGNOSIS — Z57.8 EMPLOYEE EXPOSURE TO BODY FLUIDS: ICD-10-CM

## 2017-08-17 PROCEDURE — 25000125 ZZHC RX 250: Performed by: PHYSICIAN ASSISTANT

## 2017-08-17 PROCEDURE — 99282 EMERGENCY DEPT VISIT SF MDM: CPT

## 2017-08-17 RX ORDER — PROPARACAINE HYDROCHLORIDE 5 MG/ML
2 SOLUTION/ DROPS OPHTHALMIC ONCE
Status: COMPLETED | OUTPATIENT
Start: 2017-08-17 | End: 2017-08-17

## 2017-08-17 RX ADMIN — PROPARACAINE HYDROCHLORIDE 2 DROP: 5 SOLUTION/ DROPS OPHTHALMIC at 19:24

## 2017-08-17 SDOH — HEALTH STABILITY - PHYSICAL HEALTH: OCCUPATIONAL EXPOSURE TO OTHER RISK FACTORS: Z57.8

## 2017-08-17 ASSESSMENT — ENCOUNTER SYMPTOMS
EYE REDNESS: 0
EYE ITCHING: 1
EYE PAIN: 0

## 2017-08-17 NOTE — ED AVS SNAPSHOT
Emergency Department    [address] [place] [address]    Phone:  [phone]    Fax:  [phone]                                       Rachel Blackman   MRN: [id number]    Department:   Emergency Department   Date of Visit:  8/17/2017           After Visit Summary Signature Page     I have received my discharge instructions, and my questions have been answered. I have discussed any challenges I see with this plan with the nurse or doctor.    ..........................................................................................................................................  Patient/Patient Representative Signature      ..........................................................................................................................................  Patient Representative Print Name and Relationship to Patient    ..................................................               ................................................  Date                                            Time    ..........................................................................................................................................  Reviewed by Signature/Title    ...................................................              ..............................................  Date                                                            Time

## 2017-08-17 NOTE — ED PROVIDER NOTES
History     Chief Complaint:  Body Fluid Exposure     HPI   Rachel Blackman is a 36 year old male who presents for evaluation following a body fluid exposure. Today, the patient was working here at [facility] when a code 21 patient spit into his eyes and face. Following this, the patient did irrigate his eyes. The patient does not wear contacts and is fully immunized. He has had some itching in his right eye but no eye pain, eye redness, or visual disturbance since then.     Allergies:  NKDA      Medications:    DILTIAZEM HCL PO  LOSARTAN POTASSIUM PO  isoniazid (NYDRAZID) 300 MG tablet  pyridOXINE (VITAMIN B-6) 25 MG tablet    Past Medical History:    Hypertension     Past Surgical History:    Hemorrhoidectomy     Family History:    Hypertension - Mother   Diabetes - Mother     Social History:  Marital status:       Immunization status:   Up to date      Review of Systems   Eyes: Positive for itching (right). Negative for pain, redness and visual disturbance.        (+) Body fluid exposure, bilateral eyes    All other systems reviewed and are negative.    Physical Exam     Patient Vitals for the past 24 hrs:   BP Temp Temp src Heart Rate SpO2 Height Weight   08/17/17 2041 (!) 151/100 - - 76 96 % - -   08/17/17 1823 (!) 143/101 98.8  F (37.1  C) Oral 72 97 % 1.829 m (6') 79.4 kg (175 lb)       Physical Exam  Nursing note and vitals reviewed.     GENERAL: Alert, mild distress, non toxic appearing.   HEENT:   MMM. PERRLA. EOMI.   Visual acuity: right eye - 20/20; left eye - 20/20.  Bilateral eyes: Normal conjunctiva - no hemorrhage, or lacerations. No scleral icterus. On slit lamp exam - no foreign body noted in cornea or conjunctiva. No hyphema. No corneal laceration or Sidel sign. No corneal abrasion or ulcer.    NECK: Supple.  CARDIAC: Normal rate and regular rhythm. Normal heart sounds. No murmurs, rubs, or gallops appreciated.  PULMONARY: CTA bilaterally. Normal breath sounds. No wheezing,  crackles, or rhonchi appreciated.  NEURO: Alert and oriented. Non-focal.   MUSCULOSKELETAL: Normal range of motion.   SKIN: Skin is warm and dry. No rashes. No pallor or jaundice.   PSYCH: Normal affect and mood.     Emergency Department Course     Emergency Department Course:  Nursing notes and vitals reviewed.  1821: I performed an exam of the patient as documented above.     I spoke with Dr. Kraft of the infectious disease service regarding patient's presentation, findings, and plan of care.    Findings and plan explained to the Patient. Patient discharged home with instructions regarding supportive care, medications, and reasons to return. The importance of close follow-up was reviewed.     Impression & Plan      Medical Decision Making:  Rachel lBackman is a 36 year old male who presents with concern for body fluid exposure. 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 The saliva got into his eyes, right worse than left. Here, he is afebrile, slightly hypertensive but otherwise hemodynamically stable, and non-toxic appearing. He does have some mild irritation of the right conjunctiva but no signs concerning for infection, foreign body, corneal abrasion or ulcer, or any other acute process on slit lamp exam. His eyes were irrigated with Avery lens and he felt some improvement following this. He is up to date on immunizations including tetanus. Given the source patient is known, she was tested per lab protocol with results pending. The source patient has no documented history of HIV or hepatitis. I spoke with Dr. Kraft of infectious disease who felt that this incident is very low risk for HIV transmission and did not feel that starting prophylaxis was highly indicated unless the patient was concerned about this. I had a lengthy discussion with the patient with regards to this, and he elected not to start prophylactic treatment and will await rapid HIV test from source  patient, which again I feel is reasonable after speaking with Dr. Kraft. Reasons to return to the ED were reviewed including visual loss, persistent eye redness, drainage, or any other new concerns. The patient was in agreement with plan and discharged in satisfactory condition with all questions answered.  Patient understands employee health will follow up on results.     Of note, he was slightly hypertensive here and has a known history of hypertension. No clinical history concerning for end organ damage. Will have recheck with PCP.     Diagnosis:    ICD-10-CM   1. Employee exposure to body fluids Z57.8       Disposition:  Discharged to home.       IAditya, am serving as a scribe at 6:21 PM on 8/17/2017 to document services personally performed by Ivory Landon PA-C, based on my observations and the provider's statements to me.     EMERGENCY DEPARTMENT       Ivory Landon PA-C  08/18/17 0241

## 2017-08-17 NOTE — ED AVS SNAPSHOT
Emergency Department    [address] [facility] [address]    Phone:  [phone]    Fax:  [phone]                                       Rachel Blackman   MRN: [id number]    Department:   Emergency Department   Date of Visit:  8/17/2017           Patient Information     Date Of Birth          1980        Your diagnoses for this visit were:     Employee exposure to body fluids        You were seen by Ivory Landon PA-C.      Follow-up Information     Follow up with  Emergency Department.    Specialty:  EMERGENCY MEDICINE    Why:  If symptoms worsen    Contact information:    [address] [facility] [address] [phone]        Follow up with Clinic, [facility] In 3 days.    Why:  As needed    Contact information:    [address] [facility] [address] [phone]          Discharge Instructions       Employee health will follow up on results.   Return for persistent eye redness, pain, high fevers, discharge or any other new concerns.         Discharge References/Attachments     BODY FLUID EXPOSURE, HEALTHCARE WORKER (ENGLISH)      24 Hour Appointment Hotline       To make an appointment at any [facility], call [phone] ([phone]). If you don't have a family doctor or clinic, we will help you find one. [facility] clinics are conveniently located to serve the needs of you and your family.             Review of your medicines      Our records show that you are taking the medicines listed below. If these are incorrect, please call your family doctor or clinic.        Dose / Directions Last dose taken    DILTIAZEM HCL PO        Refills:  0        isoniazid 300 MG tablet   Commonly known as:  NYDRAZID   Dose:  300 mg   Quantity:  90 tablet        Take 1 tablet (300 mg) by mouth daily   Refills:  2        LOSARTAN POTASSIUM PO        Refills:  0        pyridOXINE 25 MG tablet   Commonly known as:  vitamin B-6   Dose:  25 mg    Quantity:  90 tablet        Take 1 tablet (25 mg) by mouth daily   Refills:  2                Orders Needing Specimen Collection     None      Pending Results     No orders found from 8/15/2017 to 8/18/2017.            Pending Culture Results     No orders found from 8/15/2017 to 8/18/2017.            Pending Results Instructions     If you had any lab results that were not finalized at the time of your Discharge, you can call the ED Lab Result RN at [phone]. You will be contacted by this team for any positive Lab results or changes in treatment. The nurses are available 7 days a week from 10A to 6:30P.  You can leave a message 24 hours per day and they will return your call.        Test Results From Your Hospital Stay               Clinical Quality Measure: Blood Pressure Screening     Your blood pressure was checked while you were in the emergency department today. The last reading we obtained was  BP: (!) 143/101 . Please read the guidelines below about what these numbers mean and what you should do about them.  If your systolic blood pressure (the top number) is less than 120 and your diastolic blood pressure (the bottom number) is less than 80, then your blood pressure is normal. There is nothing more that you need to do about it.  If your systolic blood pressure (the top number) is 120-139 or your diastolic blood pressure (the bottom number) is 80-89, your blood pressure may be higher than it should be. You should have your blood pressure rechecked within a year by a primary care provider.  If your systolic blood pressure (the top number) is 140 or greater or your diastolic blood pressure (the bottom number) is 90 or greater, you may have high blood pressure. High blood pressure is treatable, but if left untreated over time it can put you at risk for heart attack, stroke, or kidney failure. You should have your blood pressure rechecked by a primary care provider within the next 4 weeks.  If your  "provider in the emergency department today gave you specific instructions to follow-up with your doctor or provider even sooner than that, you should follow that instruction and not wait for up to 4 weeks for your follow-up visit.        Thank you for choosing [facility]       Thank you for choosing [facility] for your care. Our goal is always to provide you with excellent care. Hearing back from our patients is one way we can continue to improve our services. Please take a few minutes to complete the written survey that you may receive in the mail after you visit with us. Thank you!        [employer] Information     [employer] lets you send messages to your doctor, view your test results, renew your prescriptions, schedule appointments and more. To sign up, go to [email or web address] . Click on \"Log in\" on the left side of the screen, which will take you to the Welcome page. Then click on \"Sign up Now\" on the right side of the page.     You will be asked to enter the access code listed below, as well as some personal information. Please follow the directions to create your username and password.     Your access code is: [email or web address]  Expires: 2017  1:36 AM     Your access code will  in 90 days. If you need help or a new code, please call your [facility] clinic or [phone].        Care EveryWhere ID     This is your Care EveryWhere ID. This could be used by other organizations to access your [facility] medical records  [id number]        Equal Access to Services     LEIGHA FERNÁNDEZ AH: Hadii shania andersono Somichelle, waaxda luqadaha, qaybta kaalmada adeegyada, pino vela . So [facility] [phone].    ATENCIÓN: Si habla español, tiene a sierra disposición servicios gratuitos de asistencia lingüística. Aftab al [phone].    We comply with applicable federal civil rights laws and [place] laws. We do not discriminate on the basis of race, color, national origin, age, disability sex, sexual " orientation or gender identity.            After Visit Summary       This is your record. Keep this with you and show to your community pharmacist(s) and doctor(s) at your next visit.

## 2017-08-18 ENCOUNTER — NURSE TRIAGE (OUTPATIENT)
Dept: NURSING | Facility: CLINIC | Age: 37
End: 2017-08-18

## 2017-08-18 ENCOUNTER — HOSPITAL ENCOUNTER (EMERGENCY)
Facility: CLINIC | Age: 37
Discharge: HOME | End: 2017-08-18
Attending: NURSE PRACTITIONER | Admitting: NURSE PRACTITIONER
Payer: OTHER MISCELLANEOUS

## 2017-08-18 VITALS
HEART RATE: 75 BPM | OXYGEN SATURATION: 97 % | TEMPERATURE: 97.8 F | RESPIRATION RATE: 20 BRPM | DIASTOLIC BLOOD PRESSURE: 85 MMHG | SYSTOLIC BLOOD PRESSURE: 119 MMHG

## 2017-08-18 DIAGNOSIS — H57.89 EYE IRRITATION: ICD-10-CM

## 2017-08-18 DIAGNOSIS — Z77.21 EXPOSURE TO BLOOD OR BODY FLUID: ICD-10-CM

## 2017-08-18 PROCEDURE — 99283 EMERGENCY DEPT VISIT LOW MDM: CPT

## 2017-08-18 PROCEDURE — 25000125 ZZHC RX 250: Performed by: NURSE PRACTITIONER

## 2017-08-18 RX ORDER — PROPARACAINE HYDROCHLORIDE 5 MG/ML
1 SOLUTION/ DROPS OPHTHALMIC ONCE
Status: COMPLETED | OUTPATIENT
Start: 2017-08-18 | End: 2017-08-18

## 2017-08-18 RX ADMIN — PROPARACAINE HYDROCHLORIDE 1 DROP: 5 SOLUTION/ DROPS OPHTHALMIC at 16:09

## 2017-08-18 ASSESSMENT — VISUAL ACUITY
OS: 20/40
OD: 20/40

## 2017-08-18 ASSESSMENT — ENCOUNTER SYMPTOMS
FEVER: 0
EYE PAIN: 1
EYE DISCHARGE: 0
EYE REDNESS: 1

## 2017-08-18 NOTE — ED PROVIDER NOTES
History     Chief Complaint:  Eye Pain      HPI   Rachel Blackman is a 36 year old male who presents with eye pain. The patient was seen in the ED yesterday after a code 21 patient spit in his eye. He had irrigated the eye prior to presenting to the emergency department. Pain was severe last night and today to the point he did not go to work. He returns today due to redness and the pain. Currently he rates the pain at 4/10 and describes it as a stinging pain, right greater than left. He also notes pressure in his eyes when bending down to pray. He denies visual disturbance, mattering from the eyes, fevers, or other complaint.     Allergies:  No known drug allergies      Medications:    Diltiazem  Losartan  Nydrazid  Pyridoxine     Past Medical History:    Latent tuberculosis   HTN    Past Surgical History:    Hemorrhoidectomy     Family History:    HTN  Diabetes    Social History:  Presents alone   Works at [employer]  Tobacco use: Never  Alcohol use: Negative  PCP: [facility]    Marital Status:        Review of Systems   Constitutional: Negative for fever.   Eyes: Positive for pain and redness. Negative for discharge and visual disturbance.   All other systems reviewed and are negative.      Physical Exam     Patient Vitals for the past 24 hrs:   BP Temp Temp src Pulse Resp SpO2   08/18/17 1557 119/85 97.8  F (36.6  C) Temporal 75 20 97 %        Physical Exam  Physical Exam   Constitutional:  Sitting in bed comfortably.   Head: Atraumatic.  Head moves freely with normal range of motion.   Eyes: PERRL. EOMI. No foreign body. Conjunctiva with no injection. No fluorescein uptake. Visual acuity: 20/40 bilaterally. No periorbital erythema or edema.   Neck: Normal range of motion.   Cardiovascular: Intact distal pulses: Radial pulse 2+ on the right, 2+ on the left.   Neurological: Oriented to person, place, and time. No focal deficits.   Skin: Skin is warm with no erythema  or heat to touch.        Emergency Department Course   Emergency Department Course:  Past medical records, nursing notes, and vitals reviewed.  1604: I performed an exam of the patient and obtained history, as documented above.   Visual acuity: 20/40 bilaterally.  Wood's lamp exam performed.  1622: I rechecked the patient.  Findings and plan explained to the Patient. Patient discharged home with instructions regarding supportive care, medications, and reasons to return. The importance of close follow-up was reviewed.      Impression & Plan    Medical Decision Making:  Rachel Blackman is a 36 year old male who presents for evaluation of eye pain after a body fluid exposure yesterday in which his eyes were irrigated. He notes continued eye irritation, although no discharge or visual changes. Exam here with no concerns for conjunctivitis or corneal abrasion/laceration. I suspect his eyes are dry and irritated from the irrigation yesterday. We discussed nothing today warranting antibiotics. He had post exposure protocol followed yesterday.     Diagnosis:    ICD-10-CM    1. Eye irritation H57.8    2. Exposure to blood or body fluid Z77.21        Disposition:  Discharged to home with plan as outlined.      Hector Wood  8/18/2017    EMERGENCY DEPARTMENT  I, Hector Wood, am serving as a scribe at 4:04 PM on 8/18/2017 to document services personally performed by Mary Mack APRN CNP based on my observations and the provider's statements to me.       Mary Mack APRN CNP  08/19/17 0034       Mary Mack APRN CNP  08/19/17 0034

## 2017-08-18 NOTE — DISCHARGE INSTRUCTIONS
Likely the eye irritation is from dryness after having your eyes irrigated/flushed yesterday.     You can buy over the counter lubricating eye drops or this will improve on it's own over the next 1-2 days.     If you have fever, discharge from the eyes or your eyes are stuck shut in the morning, return here or your clinic.     If you have visual changes return to the ER.

## 2017-08-18 NOTE — ED AVS SNAPSHOT
Emergency Department    [address] [facility] [address]    Phone:  [phone]    Fax:  [phone]                                       Rachel Blackman   MRN: [id number]    Department:   Emergency Department   Date of Visit:  8/18/2017           Patient Information     Date Of Birth          1980        Your diagnoses for this visit were:     Eye irritation     Exposure to blood or body fluid        You were seen by Mary Mack APRN CNP.      Follow-up Information     Follow up with Trever Eric MD In 2 days.    Specialty:  Ophthalmology    Why:  if no improvement     Contact information:    [facility]  [address]  [facility] [address] [phone]          Discharge Instructions       Likely the eye irritation is from dryness after having your eyes irrigated/flushed yesterday.     You can buy over the counter lubricating eye drops or this will improve on it's own over the next 1-2 days.     If you have fever, discharge from the eyes or your eyes are stuck shut in the morning, return here or your clinic.     If you have visual changes return to the ER.         24 Hour Appointment Hotline       To make an appointment at any [facility] call [phone] ([phone]). If you don't have a family doctor or clinic, we will help you find one. [facility] clinics are conveniently located to serve the needs of you and your family.             Review of your medicines      Our records show that you are taking the medicines listed below. If these are incorrect, please call your family doctor or clinic.        Dose / Directions Last dose taken    DILTIAZEM HCL PO        Refills:  0        isoniazid 300 MG tablet   Commonly known as:  NYDRAZID   Dose:  300 mg   Quantity:  90 tablet        Take 1 tablet (300 mg) by mouth daily   Refills:  2        LOSARTAN POTASSIUM PO        Refills:  0        pyridOXINE 25 MG tablet   Commonly known as:   vitamin B-6   Dose:  25 mg   Quantity:  90 tablet        Take 1 tablet (25 mg) by mouth daily   Refills:  2                Orders Needing Specimen Collection     None      Pending Results     No orders found from 8/16/2017 to 8/19/2017.            Pending Culture Results     No orders found from 8/16/2017 to 8/19/2017.            Pending Results Instructions     If you had any lab results that were not finalized at the time of your Discharge, you can call the ED Lab Result RN at [phone]. You will be contacted by this team for any positive Lab results or changes in treatment. The nurses are available 7 days a week from 10A to 6:30P.  You can leave a message 24 hours per day and they will return your call.        Test Results From Your Hospital Stay               Clinical Quality Measure: Blood Pressure Screening     Your blood pressure was checked while you were in the emergency department today. The last reading we obtained was  BP: 119/85 . Please read the guidelines below about what these numbers mean and what you should do about them.  If your systolic blood pressure (the top number) is less than 120 and your diastolic blood pressure (the bottom number) is less than 80, then your blood pressure is normal. There is nothing more that you need to do about it.  If your systolic blood pressure (the top number) is 120-139 or your diastolic blood pressure (the bottom number) is 80-89, your blood pressure may be higher than it should be. You should have your blood pressure rechecked within a year by a primary care provider.  If your systolic blood pressure (the top number) is 140 or greater or your diastolic blood pressure (the bottom number) is 90 or greater, you may have high blood pressure. High blood pressure is treatable, but if left untreated over time it can put you at risk for heart attack, stroke, or kidney failure. You should have your blood pressure rechecked by a primary care provider within the next 4  "weeks.  If your provider in the emergency department today gave you specific instructions to follow-up with your doctor or provider even sooner than that, you should follow that instruction and not wait for up to 4 weeks for your follow-up visit.        Thank you for choosing [facility]       Thank you for choosing [facility] for your care. Our goal is always to provide you with excellent care. Hearing back from our patients is one way we can continue to improve our services. Please take a few minutes to complete the written survey that you may receive in the mail after you visit with us. Thank you!        [employer] Information     [employer] lets you send messages to your doctor, view your test results, renew your prescriptions, schedule appointments and more. To sign up, go to [email or web address] . Click on \"Log in\" on the left side of the screen, which will take you to the Welcome page. Then click on \"Sign up Now\" on the right side of the page.     You will be asked to enter the access code listed below, as well as some personal information. Please follow the directions to create your username and password.     Your access code is: [email or web address]  Expires: 2017  1:36 AM     Your access code will  in 90 days. If you need help or a new code, please call your [facility] clinic or [phone].        Care EveryWhere ID     This is your Care EveryWhere ID. This could be used by other organizations to access your [facility] medical records  [id number]        Equal Access to Services     LEIGHA FERNÁNDEZ : Hadii shania Rangel, waaxda luqadaha, qaybta kaalmada joshua, pino herbert. So [facility] [phone].    ATENCIÓN: Si habla español, tiene a sierra disposición servicios gratuitos de asistencia lingüística. Llame al [phone].    We comply with applicable federal civil rights laws and [place] laws. We do not discriminate on the basis of race, color, national origin, age, " disability sex, sexual orientation or gender identity.            After Visit Summary       This is your record. Keep this with you and show to your community pharmacist(s) and doctor(s) at your next visit.

## 2017-08-18 NOTE — DISCHARGE INSTRUCTIONS
Employee health will follow up on results.   Return for persistent eye redness, pain, high fevers, discharge or any other new concerns.

## 2017-08-18 NOTE — ED AVS SNAPSHOT
Emergency Department    [address] [place] [address]    Phone:  [phone]    Fax:  [phone]                                       Rachel Blackman   MRN: [id number]    Department:   Emergency Department   Date of Visit:  8/18/2017           After Visit Summary Signature Page     I have received my discharge instructions, and my questions have been answered. I have discussed any challenges I see with this plan with the nurse or doctor.    ..........................................................................................................................................  Patient/Patient Representative Signature      ..........................................................................................................................................  Patient Representative Print Name and Relationship to Patient    ..................................................               ................................................  Date                                            Time    ..........................................................................................................................................  Reviewed by Signature/Title    ...................................................              ..............................................  Date                                                            Time

## 2017-08-18 NOTE — TELEPHONE ENCOUNTER
"  Reason for Disposition    [1] Caller requesting NON-URGENT health information AND [2] PCP's office is the best resource     \"I was in the ER (see epic) for an eye injury. The doctor said to come back in if it got worse. It's feels heavy, it's really red and painful. I couldn't go to work.\" Advised ER.    Additional Information    Negative: [1] Caller is not with the adult (patient) AND [2] reporting urgent symptoms    Negative: Lab result questions    Negative: Medication questions    Negative: Caller cannot be reached by phone    Negative: Caller has already spoken to PCP or another triager    Negative: RN needs further essential information from caller in order to complete triage    Negative: Requesting regular office appointment    Protocols used: INFORMATION ONLY CALL-ADULT-    "

## 2019-08-07 ENCOUNTER — TRANSFERRED RECORDS (OUTPATIENT)
Dept: HEALTH INFORMATION MANAGEMENT | Facility: CLINIC | Age: 39
End: 2019-08-07

## 2019-08-14 ENCOUNTER — MEDICAL CORRESPONDENCE (OUTPATIENT)
Dept: HEALTH INFORMATION MANAGEMENT | Facility: CLINIC | Age: 39
End: 2019-08-14

## 2019-09-04 ENCOUNTER — MEDICAL CORRESPONDENCE (OUTPATIENT)
Dept: HEALTH INFORMATION MANAGEMENT | Facility: CLINIC | Age: 39
End: 2019-09-04

## 2019-09-05 NOTE — TELEPHONE ENCOUNTER
FUTURE VISIT INFORMATION      FUTURE VISIT INFORMATION:    Date: 9/26/19    Time: 11:00 am    Location: [facility]  REFERRAL INFORMATION:    Referring provider:  Dr. Mumtaz Rodriguez    Referring providers clinic:  [facility]    Reason for visit/diagnosis  Discomfort in the posterior oral cavity and throat    RECORDS REQUESTED FROM:       Clinic name Comments Records Status Imaging Status   [facility] Referral note-8/14/19-Dr. Rodriguez [facility] Surgery-9/30/15-Dr. Johnson Care Everywhere     Otolaryngology Office Visit-10/20/15, -7/31/15-Dr. Jordy Johnson Care Everywhere    [facility] Visit-12/2/17, 11/16/16 Care Everywhere

## 2019-09-26 ENCOUNTER — PRE VISIT (OUTPATIENT)
Dept: OTOLARYNGOLOGY | Facility: CLINIC | Age: 39
End: 2019-09-26

## 2019-09-27 ENCOUNTER — OFFICE VISIT (OUTPATIENT)
Dept: OTOLARYNGOLOGY | Facility: CLINIC | Age: 39
End: 2019-09-27
Payer: COMMERCIAL

## 2019-09-27 VITALS — HEIGHT: 70 IN | WEIGHT: 195 LBS | BODY MASS INDEX: 27.92 KG/M2

## 2019-09-27 DIAGNOSIS — K13.79 LESION OF SOFT PALATE: Primary | ICD-10-CM

## 2019-09-27 RX ORDER — HYDROCHLOROTHIAZIDE 25 MG/1
TABLET ORAL
COMMUNITY
Start: 2019-09-12

## 2019-09-27 RX ORDER — PSEUDOEPHED/ACETAMINOPH/DIPHEN 30MG-500MG
TABLET ORAL
COMMUNITY
Start: 2019-07-26

## 2019-09-27 RX ORDER — LORATADINE 10 MG/1
TABLET ORAL
COMMUNITY
Start: 2019-09-12

## 2019-09-27 RX ORDER — FLUOCINONIDE 0.5 MG/G
CREAM TOPICAL
COMMUNITY
Start: 2019-03-29

## 2019-09-27 ASSESSMENT — PATIENT HEALTH QUESTIONNAIRE - PHQ9
10. IF YOU CHECKED OFF ANY PROBLEMS, HOW DIFFICULT HAVE THESE PROBLEMS MADE IT FOR YOU TO DO YOUR WORK, TAKE CARE OF THINGS AT HOME, OR GET ALONG WITH OTHER PEOPLE: NOT DIFFICULT AT ALL
SUM OF ALL RESPONSES TO PHQ QUESTIONS 1-9: 0
SUM OF ALL RESPONSES TO PHQ QUESTIONS 1-9: 0

## 2019-09-27 ASSESSMENT — MIFFLIN-ST. JEOR: SCORE: 1801.79

## 2019-09-27 NOTE — LETTER
2019       RE: Rachel Blackman  [address]  [facility] [address]     Dear Colleague,    Thank you for referring your patient, Rachel Blackman, to the [facility] EAR NOSE AND THROAT at [facility]. Please see a copy of my visit note below.    Otolaryngology Adult Consultation    Patient: Rachel Blackman  : 1980      HPI:  Rachel Blackman is a 39 year old male seen today in the Otolaryngology Clinic for examination of his soft palate.  He was recently at the [facility] and they noted swelling of the soft palate and erythema.  They were concerned as to what this might represent and recommended that he be seen by an ENT.  Past medical history significant for tonsillectomy and a uvulopalatopharyngoplasty that was done as a Z-plasty style in .  Patient denies any pain.  He does note that when he swallows it feels a little itching on or different to him.  This occurred after the surgery.  He has not had any issues with.    Medications:  Current Outpatient Rx   Medication Sig Dispense Refill     fluocinonide (LIDEX) 0.05 % external cream        ACETAMINOPHEN EXTRA STRENGTH 500 MG tablet        DILTIAZEM HCL PO        hydrochlorothiazide (HYDRODIURIL) 25 MG tablet        isoniazid (NYDRAZID) 300 MG tablet Take 1 tablet (300 mg) by mouth daily 90 tablet 2     loratadine (CLARITIN) 10 MG tablet        LOSARTAN POTASSIUM PO        omeprazole (PRILOSEC) 20 MG DR capsule        pyridOXINE (VITAMIN B-6) 25 MG tablet Take 1 tablet (25 mg) by mouth daily 90 tablet 2     ranitidine (ZANTAC) 150 MG tablet          Allergies: Hydrocodone-acetaminophen     PMH:  Past Medical History:   Diagnosis Date     Hypertension        PSH:  Past Surgical History:   Procedure Laterality Date     HEMORRHOIDECTOMY         FH:  Family History   Problem Relation Age of Onset     Hypertension Mother         on HTN meds     Diabetes Mother         off meds, se's         SH:  Social History     Tobacco Use     Smoking status: Never Smoker     Smokeless tobacco: Never Used   Substance Use Topics     Alcohol use: No     Drug use: No       Review of Systems   ENT ROS 9/27/2019   Gastrointestinal/Genitourinary Heartburn/indigestion       Physical Exam:    GEN:  The patient is alert, oriented and in no acute distress.  HEAD:  Head, face scalp is grossly normal.  EARS:  Ears demonstrate normal canals, auricles and tympanic membranes.  NOSE:  External nose is straight, skin is normal.                Intranasal exam (anterior rhinoscopy) reveals normal moist mucosa, turbinate                  tissue without edema, erythema or crusting.  Septum deviated to right and left due to septal spurs.  ORAL:  Oral cavity shows healthy mucosa with out ulceration, masses or other lesions                involving the tongue, palate, buccal mucosa, floor of mouth or gingiva.   He does have a butterfly shaped swelling on his soft palate that is consistent with a former Z-plasty type of surgery surgery.      Laryngoscopy is performed to examine the upper airway for pathology, functional or anatomic abnormality.  After application of topical anesthetic/decongestant solution the flexible endoscope was passed into each nasal cavity separately.  Findings are as follows:   Nasal passages without abnormality.     Nasopharynx:  Clear, no lesions, crusting or inflammation   Base of tongue, vallecula, epiglottis, aryepiglottic folds, false vocal folds without mucosal lesions, masses or anatomic abnormality.  Nasal surface of the soft palate is smooth without any erythema or edema.   Glottis with normal movement of vocal folds, normal mucosa and no lesions   Pyriform sinuses, post-cricoid area, and posterior pharyngeal wall without lesions       Assessment/Plan: Patient presents for evaluation of a soft palate.  I reassured the patient that I did not see any signs that concern me for tumor, or cancer.  He does  have some swelling of the soft palate but because of its particular shape I believe this is because of the Z-plasty type of surgery that he had.  Essentially the foot to the uvula and part of the nasal surface of the soft palate onto the oral surface.  This explains the color change as well as some of the swelling because it is doubling the tissue in that area.  He likely is experiencing some globus sensation also due to surgery which is a result of just healing and surgical scar.  At this time he may follow-up with us as needed.    I spent a total of 35 minutes face-to-face with Ramos S Yehuda during today's office visit.  Over 50% of this time was spent counseling the patient on and/or coordinating care as documented in my assessment and plan.      Answers for HPI/ROS submitted by the patient on 9/27/2019   If you checked off any problems, how difficult have these problems made it for you to do your work, take care of things at home, or get along with other people?: Not difficult at all  PHQ9 TOTAL SCORE: 0      Again, thank you for allowing me to participate in the care of your patient.      Sincerely,    Leticia Petit MD

## 2019-09-27 NOTE — PROGRESS NOTES
Otolaryngology Adult Consultation    Patient: Rachel Blackman  : 1980      HPI:  Rachel Blackman is a 39 year old male seen today in the Otolaryngology Clinic for examination of his soft palate.  He was recently at the [place] [facility] and they noted swelling of the soft palate and erythema.  They were concerned as to what this might represent and recommended that he be seen by an ENT.  Past medical history significant for tonsillectomy and a uvulopalatopharyngoplasty that was done as a Z-plasty style in .  Patient denies any pain.  He does note that when he swallows it feels a little itching on or different to him.  This occurred after the surgery.  He has not had any issues with.    Medications:  Current Outpatient Rx   Medication Sig Dispense Refill     fluocinonide (LIDEX) 0.05 % external cream        ACETAMINOPHEN EXTRA STRENGTH 500 MG tablet        DILTIAZEM HCL PO        hydrochlorothiazide (HYDRODIURIL) 25 MG tablet        isoniazid (NYDRAZID) 300 MG tablet Take 1 tablet (300 mg) by mouth daily 90 tablet 2     loratadine (CLARITIN) 10 MG tablet        LOSARTAN POTASSIUM PO        omeprazole (PRILOSEC) 20 MG DR capsule        pyridOXINE (VITAMIN B-6) 25 MG tablet Take 1 tablet (25 mg) by mouth daily 90 tablet 2     ranitidine (ZANTAC) 150 MG tablet          Allergies: Hydrocodone-acetaminophen     PMH:  Past Medical History:   Diagnosis Date     Hypertension        PSH:  Past Surgical History:   Procedure Laterality Date     HEMORRHOIDECTOMY         FH:  Family History   Problem Relation Age of Onset     Hypertension Mother         on HTN meds     Diabetes Mother         off meds, se's        SH:  Social History     Tobacco Use     Smoking status: Never Smoker     Smokeless tobacco: Never Used   Substance Use Topics     Alcohol use: No     Drug use: No       Review of Systems   ENT ROS 2019   Gastrointestinal/Genitourinary Heartburn/indigestion       Physical Exam:    GEN:   The patient is alert, oriented and in no acute distress.  HEAD:  Head, face scalp is grossly normal.  EARS:  Ears demonstrate normal canals, auricles and tympanic membranes.  NOSE:  External nose is straight, skin is normal.                Intranasal exam (anterior rhinoscopy) reveals normal moist mucosa, turbinate                  tissue without edema, erythema or crusting.  Septum deviated to right and left due to septal spurs.  ORAL:  Oral cavity shows healthy mucosa with out ulceration, masses or other lesions                involving the tongue, palate, buccal mucosa, floor of mouth or gingiva.   He does have a butterfly shaped swelling on his soft palate that is consistent with a former Z-plasty type of surgery surgery.      Laryngoscopy is performed to examine the upper airway for pathology, functional or anatomic abnormality.  After application of topical anesthetic/decongestant solution the flexible endoscope was passed into each nasal cavity separately.  Findings are as follows:   Nasal passages without abnormality.     Nasopharynx:  Clear, no lesions, crusting or inflammation   Base of tongue, vallecula, epiglottis, aryepiglottic folds, false vocal folds without mucosal lesions, masses or anatomic abnormality.  Nasal surface of the soft palate is smooth without any erythema or edema.   Glottis with normal movement of vocal folds, normal mucosa and no lesions   Pyriform sinuses, post-cricoid area, and posterior pharyngeal wall without lesions       Assessment/Plan: Patient presents for evaluation of a soft palate.  I reassured the patient that I did not see any signs that concern me for tumor, or cancer.  He does have some swelling of the soft palate but because of its particular shape I believe this is because of the Z-plasty type of surgery that he had.  Essentially the foot to the uvula and part of the nasal surface of the soft palate onto the oral surface.  This explains the color change as well as  some of the swelling because it is doubling the tissue in that area.  He likely is experiencing some globus sensation also due to surgery which is a result of just healing and surgical scar.  At this time he may follow-up with us as needed.    I spent a total of 35 minutes face-to-face with Rachel Blackman during today's office visit.  Over 50% of this time was spent counseling the patient on and/or coordinating care as documented in my assessment and plan.      Answers for HPI/ROS submitted by the patient on 9/27/2019   If you checked off any problems, how difficult have these problems made it for you to do your work, take care of things at home, or get along with other people?: Not difficult at all  PHQ9 TOTAL SCORE: 0

## 2019-09-28 ASSESSMENT — PATIENT HEALTH QUESTIONNAIRE - PHQ9: SUM OF ALL RESPONSES TO PHQ QUESTIONS 1-9: 0

## 2020-01-12 ENCOUNTER — HOSPITAL ENCOUNTER (EMERGENCY)
Facility: CLINIC | Age: 40
Discharge: HOME | End: 2020-01-12
Attending: NURSE PRACTITIONER | Admitting: NURSE PRACTITIONER
Payer: OTHER MISCELLANEOUS

## 2020-01-12 VITALS
RESPIRATION RATE: 16 BRPM | TEMPERATURE: 97.2 F | OXYGEN SATURATION: 99 % | HEIGHT: 71 IN | WEIGHT: 185 LBS | DIASTOLIC BLOOD PRESSURE: 90 MMHG | SYSTOLIC BLOOD PRESSURE: 158 MMHG | BODY MASS INDEX: 25.9 KG/M2

## 2020-01-12 DIAGNOSIS — M54.50 BILATERAL LOW BACK PAIN WITHOUT SCIATICA: ICD-10-CM

## 2020-01-12 DIAGNOSIS — M62.838 MUSCLE SPASM: ICD-10-CM

## 2020-01-12 PROCEDURE — 99282 EMERGENCY DEPT VISIT SF MDM: CPT

## 2020-01-12 RX ORDER — CYCLOBENZAPRINE HCL 10 MG
10 TABLET ORAL 3 TIMES DAILY PRN
Qty: 21 TABLET | Refills: 0 | Status: SHIPPED | OUTPATIENT
Start: 2020-01-12

## 2020-01-12 RX ORDER — IBUPROFEN 600 MG/1
600 TABLET, FILM COATED ORAL EVERY 6 HOURS PRN
Qty: 40 TABLET | Refills: 0 | Status: SHIPPED | OUTPATIENT
Start: 2020-01-12

## 2020-01-12 ASSESSMENT — ENCOUNTER SYMPTOMS
FEVER: 0
BACK PAIN: 1

## 2020-01-12 ASSESSMENT — MIFFLIN-ST. JEOR: SCORE: 1776.28

## 2020-01-12 NOTE — LETTER
January 12, 2020      To Whom It May Concern:      Rachel Blackman was seen in our Emergency Department today, 01/12/20.  He will need tomorrow off of work and will be following up with Occupational Health.         Sincerely,        MATEO Hylton CNP

## 2020-01-12 NOTE — ED AVS SNAPSHOT
Emergency Department  [address] [place] [address]  Phone:  [phone]  Fax:  [phone]                                    Rachel Blackman   MRN: [id number]    Department:   Emergency Department   Date of Visit:  1/12/2020           After Visit Summary Signature Page    I have received my discharge instructions, and my questions have been answered. I have discussed any challenges I see with this plan with the nurse or doctor.    ..........................................................................................................................................  Patient/Patient Representative Signature      ..........................................................................................................................................  Patient Representative Print Name and Relationship to Patient    ..................................................               ................................................  Date                                   Time    ..........................................................................................................................................  Reviewed by Signature/Title    ...................................................              ..............................................  Date                                               Time          22EPIC Rev 08/18

## 2020-01-13 NOTE — ED PROVIDER NOTES
"  History     Chief Complaint:  Back Pain (Pt. having left LBP today after helping lift pt. No pain rad. )    HPI   Rachel Blackman is a 39 year old male who presents for the evaluation of back pain. 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 The patient notes that when he is standing he starts to shake due to the pain in his back. He describes that he had to help lift the patient at their legs every time they transferred. He states that the pain does not radiate into his legs, but notes that it will wrap around into his abdomen area. The patient denies bowel or bladder incontinence, fever, and other issues.  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 No history of back surgery.     Allergies:  Hydrocodone-Acetaminophen      Medications:    Hydrodiuril  Nydrazid  Losartan  Prilosec  Zantac     Past Medical History:    Hypertension  Latent tuberculosis  Constipation  Hemorrhoids  GERD  Cervicalgia  Thrombocytopenia     Past Surgical History:    History reviewed. No pertinent surgical history.     Family History:    Diabetes - Mother    Social History:  Smoking status: Never Smoker  Alcohol use: No  Drug use: No  Marital Status:   [2]     Review of Systems   Constitutional: Negative for fever.   Genitourinary:        Negative for bladder incontinence   Musculoskeletal: Positive for back pain.   All other systems reviewed and are negative.        Physical Exam     Patient Vitals for the past 24 hrs:   BP Temp Temp src Heart Rate Resp SpO2 Height Weight   01/12/20 2028 (!) 158/90 97.2  F (36.2  C) Temporal 94 16 99 % 1.803 m (5' 11\") 83.9 kg (185 lb)      Physical Exam  Physical Exam   Constitutional: Pt appears well-developed and well-nourished. Non toxic appearing.   Head: Head moves freely with " normal range of motion.   ENT: Oropharynx is clear and moist.   Eyes: Conjunctivae pink. EOMs intact.   Neck: Normal range of motion.    Cardiovascular: Regular rate and rhythm. Normal heart sounds. No concerning murmur. Intact distal pulses: radial pulses 2+ on the right, 2+ on the left. Pedal pulses 2+ on the right, 2+ on the left.   Pulmonary/Chest: No respiratory distress.  Breath sounds normal.   Abdominal: Soft. Non-tender. No rebound, no guarding. No CVA tenderness.  Musculoskeletal: Distal capillary refill and sensation intact. Lower extremity strength 5/5. Patellar and achilles reflexes 2+. Negative straight leg raise and crossover.  No bony spinal erythema, edema or heat to touch. Tender and tension to palpation at the musculature of the low back. No pain over the sciatic notch.    Neurological: Oriented to person, place, and time. No focal deficits. No saddle anesthesia.   Skin: Skin is warm.       Emergency Department Course   Emergency Department Course:  Past medical records, nursing notes, and vitals reviewed.  2051: I performed an exam of the patient and obtained history, as documented above.     Findings and plan explained to the Patient. Patient discharged home with instructions regarding supportive care, medications, and reasons to return. The importance of close follow-up was reviewed. The patient was prescribed Advil and Flexeril.       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 No urinary symptoms, no CVA tenderness, this is unlikely pain related to UTI, pyelonephritis or renal colic/stone. Pt has no radiating pain, negative SLR, no bladder or bowel dysfunction, normal reflexes, with normal motor strength and function of lower extremities. No saddle anesthesia. Pt is afebrile and non-toxic appearing with no bony  tenderness, erythema or heat to touch. These findings do not correlate with diskitis, acute radiculopathy, cord compression, cauda equina syndrome or epidural abscess. Symptoms and exam consistent with mechanical back pain.     We discussed use of Ibuprofen and flexeril for symptoms, as well as concerning signs and symptoms to return to the ED for. Pt amenable to plan.         Diagnosis:    ICD-10-CM    1. Bilateral low back pain without sciatica M54.5    2. Muscle spasm M62.838        Disposition:  Discharged to home with Advil and Flexeril.    Discharge Medications:  New Prescriptions    CYCLOBENZAPRINE (FLEXERIL) 10 MG TABLET    Take 1 tablet (10 mg) by mouth 3 times daily as needed for muscle spasms    IBUPROFEN (ADVIL/MOTRIN) 600 MG TABLET    Take 1 tablet (600 mg) by mouth every 6 hours as needed for moderate pain     Scribe Disclosure:  Jackson MOULTON, am serving as a scribe at 8:42 PM on 1/12/2020 to document services personally performed by Mary Mack APRN CNP based on my observations and the provider's statements to me.      Jackson Barnett  1/12/2020    EMERGENCY DEPARTMENT       Mary Mack APRN CNP  01/13/20 0040